# Patient Record
Sex: MALE | Race: WHITE | Employment: OTHER | URBAN - METROPOLITAN AREA
[De-identification: names, ages, dates, MRNs, and addresses within clinical notes are randomized per-mention and may not be internally consistent; named-entity substitution may affect disease eponyms.]

---

## 2020-07-08 ENCOUNTER — HOSPITAL ENCOUNTER (EMERGENCY)
Facility: CLINIC | Age: 65
Discharge: HOME | End: 2020-07-08
Attending: NURSE PRACTITIONER | Admitting: NURSE PRACTITIONER
Payer: COMMERCIAL

## 2020-07-08 VITALS
DIASTOLIC BLOOD PRESSURE: 96 MMHG | HEART RATE: 92 BPM | OXYGEN SATURATION: 91 % | HEIGHT: 70 IN | TEMPERATURE: 98.4 F | WEIGHT: 214 LBS | RESPIRATION RATE: 16 BRPM | BODY MASS INDEX: 30.64 KG/M2 | SYSTOLIC BLOOD PRESSURE: 154 MMHG

## 2020-07-08 DIAGNOSIS — S61.411A LACERATION OF RIGHT HAND WITHOUT FOREIGN BODY, INITIAL ENCOUNTER: ICD-10-CM

## 2020-07-08 PROCEDURE — 90715 TDAP VACCINE 7 YRS/> IM: CPT | Performed by: NURSE PRACTITIONER

## 2020-07-08 PROCEDURE — 90471 IMMUNIZATION ADMIN: CPT | Performed by: NURSE PRACTITIONER

## 2020-07-08 PROCEDURE — 25000128 H RX IP 250 OP 636: Performed by: NURSE PRACTITIONER

## 2020-07-08 PROCEDURE — 99282 EMERGENCY DEPT VISIT SF MDM: CPT | Mod: 25 | Performed by: NURSE PRACTITIONER

## 2020-07-08 PROCEDURE — 99283 EMERGENCY DEPT VISIT LOW MDM: CPT | Mod: 25 | Performed by: NURSE PRACTITIONER

## 2020-07-08 PROCEDURE — 12002 RPR S/N/AX/GEN/TRNK2.6-7.5CM: CPT | Performed by: NURSE PRACTITIONER

## 2020-07-08 PROCEDURE — 12002 RPR S/N/AX/GEN/TRNK2.6-7.5CM: CPT | Mod: Z6 | Performed by: NURSE PRACTITIONER

## 2020-07-08 RX ADMIN — CLOSTRIDIUM TETANI TOXOID ANTIGEN (FORMALDEHYDE INACTIVATED), CORYNEBACTERIUM DIPHTHERIAE TOXOID ANTIGEN (FORMALDEHYDE INACTIVATED), BORDETELLA PERTUSSIS TOXOID ANTIGEN (GLUTARALDEHYDE INACTIVATED), BORDETELLA PERTUSSIS FILAMENTOUS HEMAGGLUTININ ANTIGEN (FORMALDEHYDE INACTIVATED), BORDETELLA PERTUSSIS PERTACTIN ANTIGEN, AND BORDETELLA PERTUSSIS FIMBRIAE 2/3 ANTIGEN 0.5 ML: 5; 2; 2.5; 5; 3; 5 INJECTION, SUSPENSION INTRAMUSCULAR at 22:59

## 2020-07-08 ASSESSMENT — ENCOUNTER SYMPTOMS
NEUROLOGICAL NEGATIVE: 1
WOUND: 1
FEVER: 0
COUGH: 0

## 2020-07-08 ASSESSMENT — MIFFLIN-ST. JEOR: SCORE: 1764.01

## 2020-07-08 NOTE — ED AVS SNAPSHOT
[facility] Emergency Department  [address] [address]  Phone:  [phone]  Fax:  [phone]                                    Nadeem Mcpherson   MRN: [id number]    Department:  [facility] Emergency Department   Date of Visit:  7/8/2020           After Visit Summary Signature Page    I have received my discharge instructions, and my questions have been answered. I have discussed any challenges I see with this plan with the nurse or doctor.    ..........................................................................................................................................  Patient/Patient Representative Signature      ..........................................................................................................................................  Patient Representative Print Name and Relationship to Patient    ..................................................               ................................................  Date                                   Time    ..........................................................................................................................................  Reviewed by Signature/Title    ...................................................              ..............................................  Date                                               Time          22EPIC Rev 08/18

## 2020-07-09 NOTE — ED NOTES
Laceration to the top of the right hand about 2 cm in length. Edges well approximated. Unknown tetanus

## 2020-07-09 NOTE — ED PROVIDER NOTES
History     Chief Complaint   Patient presents with     Laceration     laceration right hand.     HPI  Nadeem Mcpherson is a 65 year old male who presents to the emergency department for evaluation of right hand laceration.  This occurred 2 hours ago while he was working on a car.  He is self-employed as a .  Denies decreased strength in hand or fingers.  Tetanus is not up-to-date.    Allergies:  Allergies   Allergen Reactions     Nkda [No Known Drug Allergies]        Problem List:    Patient Active Problem List    Diagnosis Date Noted     Calculus of gallbladder with other cholecystitis, without mention of obstruction 2011     Priority: High     CARDIOVASCULAR SCREENING; LDL GOAL LESS THAN 160 2011     Priority: Medium     Prostatitis, acute 2011     Priority: Medium     Currently being treated with Cipro. Had enlarge prostate and elevated PSA          Past Medical History:    Past Medical History:   Diagnosis Date     Enlarged prostate        Past Surgical History:    Past Surgical History:   Procedure Laterality Date     CHOLECYSTECTOMY  2011    Procedure:CHOLECYSTECTOMY; attempted laparoscopic failed went to open guero; Surgeon:CARINE EGAN; Location:[place] OR     LAPAROSCOPIC CHOLECYSTECTOMY  2011    Procedure:LAPAROSCOPIC CHOLECYSTECTOMY; Laparoscopic Cholecystectomy with Grams  ; Surgeon:CARINE EGAN; Location:[facility] OR     ORTHOPEDIC SURGERY       RELEASE CARPAL TUNNEL Bilateral 2016    Procedure: RELEASE CARPAL TUNNEL;  Surgeon: Timothy Mirza MD;  Location: [facility] OR     TONSILLECTOMY         Family History:    Family History   Problem Relation Age of Onset     Diabetes Mother        Social History:  Marital Status:   [2]  Social History     Tobacco Use     Smoking status: Former Smoker     Last attempt to quit: 2005     Years since quittin.8   Substance Use Topics     Alcohol use: Yes     Comment: daily 2 per night- none x  "4 days     Drug use: No        Medications:    HYDROcodone-acetaminophen (NORCO) 5-325 MG per tablet  NO ACTIVE MEDICATIONS          Review of Systems   Constitutional: Negative for fever.   HENT: Negative for congestion.    Respiratory: Negative for cough.    Skin: Positive for wound.   Neurological: Negative.        Physical Exam   BP: (!) 154/96  Pulse: 92  Temp: 98.4  F (36.9  C)  Resp: 16  Height: 176.5 cm (5' 9.5\")  Weight: 97.1 kg (214 lb)  SpO2: 91 %      Physical Exam  Appearance: Alert, oriented, no acute distress.  MUSC:  Skin: right dorsal hand transverse Laceration 3.5 cm.  Description: clean wound edges, no foreign bodies.   Neuro: Neurovascular and tendon structures are intact.  Hand is warm and pink.      ED Course        [facility]    -Laceration Repair    Date/Time: 7/8/2020 11:15 PM  Performed by: Radha Rodríguez APRN CNP  Authorized by: Radha Rodríguez APRN CNP       ANESTHESIA (see MAR for exact dosages):     Anesthesia method:  Local infiltration    Local anesthetic:  Bupivacaine 0.5% WITH epi  LACERATION DETAILS     Location:  Hand    Hand location:  R hand, dorsum    Length (cm):  3.5    REPAIR TYPE:     Repair type:  Simple      EXPLORATION:     Wound exploration: wound explored through full range of motion and entire depth of wound probed and visualized      TREATMENT:     Area cleansed with:  Saline    Amount of cleaning:  Standard    Irrigation solution:  Sterile saline    Irrigation volume:  250 ml    Irrigation method:  Syringe    SKIN REPAIR     Repair method:  Sutures    Suture technique:  Simple interrupted    Number of sutures:  5    APPROXIMATION     Approximation:  Close    POST-PROCEDURE DETAILS     Dressing:  Open (no dressing)      PROCEDURE   Patient Tolerance:  Patient tolerated the procedure well with no immediate complications               No results found for this or any previous visit (from the past 24 hour(s)).    Medications "   Tdap (tetanus-diphtheria-acell pertussis) (ADACEL) injection 0.5 mL (0.5 mLs Intramuscular Given 7/8/20 9952)       Assessments & Plan (with Medical Decision Making)   Right hand laceration as noted above.  Repaired as noted above with 5 sutures.  I discussed signs of infection that he should return for and instructed to have sutures removed in 10 days.  Tetanus was updated today.  I have reviewed the nursing notes.    I have reviewed the findings, diagnosis, plan and need for follow up with the patient.      Discharge Medication List as of 7/8/2020 10:55 PM          Final diagnoses:   Laceration of right hand without foreign body, initial encounter       7/8/2020   [facility] EMERGENCY DEPARTMENT     Radha oRdríguez APRN CNP  07/08/20 9882

## 2020-07-09 NOTE — DISCHARGE INSTRUCTIONS
Ok to shower, otherwise keep the wound clean, dry, and covered with bandage until sutures are removed.  Sutures out in 10 days.  Tetanus updated today.  Return for any signs of infection (increased redness, swelling, discharge, or pain).

## 2020-07-09 NOTE — ED TRIAGE NOTES
Pt states laceration to right hand while working on a car. States cut on rabia metal. Unknown last TD.

## 2023-07-20 ENCOUNTER — TRANSFERRED RECORDS (OUTPATIENT)
Dept: HEALTH INFORMATION MANAGEMENT | Facility: CLINIC | Age: 68
End: 2023-07-20
Payer: COMMERCIAL

## 2023-07-24 ENCOUNTER — TRANSFERRED RECORDS (OUTPATIENT)
Dept: HEALTH INFORMATION MANAGEMENT | Facility: CLINIC | Age: 68
End: 2023-07-24
Payer: COMMERCIAL

## 2023-07-26 ENCOUNTER — TRANSCRIBE ORDERS (OUTPATIENT)
Dept: OTHER | Age: 68
End: 2023-07-26

## 2023-07-26 ENCOUNTER — TELEPHONE (OUTPATIENT)
Dept: OTOLARYNGOLOGY | Facility: CLINIC | Age: 68
End: 2023-07-26
Payer: COMMERCIAL

## 2023-07-26 DIAGNOSIS — R22.1 NECK MASS: Primary | ICD-10-CM

## 2023-07-26 NOTE — TELEPHONE ENCOUNTER
[facility] Call Center    Phone Message    May a detailed message be left on voicemail: yes     Reason for Call: Appointment Intake    Referring Provider Name: Dr. Marilee Macdonald MD / [facility]  Diagnosis and/or Symptoms:  STAT ENT Consult for referral for a neck mass.    Protocols state to send HP TE     Action Taken: Message routed to:  [facility] ENT    Travel Screening: Not Applicable

## 2023-08-16 ENCOUNTER — TRANSCRIBE ORDERS (OUTPATIENT)
Dept: RADIATION ONCOLOGY | Facility: CLINIC | Age: 68
End: 2023-08-16
Payer: COMMERCIAL

## 2023-08-16 DIAGNOSIS — C79.89: Primary | ICD-10-CM

## 2023-08-16 DIAGNOSIS — C80.1: Primary | ICD-10-CM

## 2023-08-17 ENCOUNTER — PATIENT OUTREACH (OUTPATIENT)
Dept: ONCOLOGY | Facility: CLINIC | Age: 68
End: 2023-08-17
Payer: COMMERCIAL

## 2023-08-17 ENCOUNTER — PRE VISIT (OUTPATIENT)
Dept: OTHER | Age: 68
End: 2023-08-17

## 2023-08-17 NOTE — PROGRESS NOTES
New Patient Oncology Nurse Navigator Note     Referring provider: Dr. Chris Mansfield of [facility]     Referred to (specialty): Radiation Oncology    Requested provider (if applicable): [facility] [phone]     Date Referral Received: 2023     Evaluation for: Squamous cell carcinoma, metastatic  The patient lives in [place], [place] and works in [place], [place]. The [facility] and radiation center is 3 minutes from his work. The patient has experience being there with his father for prostate cancer.     Clinical History (per Nurse review of records provided):    **BOOK MARKED**   NOTES:  2023:  OP Note    IMAGIN2023:  PET CT  2023:  CT Chest W/ Cont & CT soft tissue neck    PATHOLOGY:  2023:  surgery today at [facility]    2023:  PATH FNA CYTOLOGY ASP CYTOLOGY  [facility]  Final Diagnosis   MASS, LEFT NECK, ULTRASOUND-GUIDED CORE BIOPSY WITH TOUCH PREPARATIONS:   1. p16-positive squamous cell carcinoma, consistent with metastasis   2. Fibrotic soft tissue and mixed inflammation in the background   3. See comment Electronically signed by Cee Kwon MD for Marlen Doe MD on 2023 at  2:45 PM    Clinical Assessment / Barriers to Care (Per Nurse): none noted       Records Location (Care Everywhere, Media, etc.): [facility], [facility] ([facility])     RECORDS NEEDED:    ~All imaging from 2023 to present pushed to PACS from [facility]  ~pathology per protocol  -Thank you!!!    Additional testing needed prior to consult: none

## 2023-08-17 NOTE — TELEPHONE ENCOUNTER
Action 08/17/2023@1025am cdk   Action Taken Imaging from [facility] resolved   Pet-08/12/23  US-08/02/23  Ct chest-07/24/23  Ct Nect- 07/24/23    ck